# Patient Record
Sex: MALE | Race: OTHER | Employment: FULL TIME | URBAN - METROPOLITAN AREA
[De-identification: names, ages, dates, MRNs, and addresses within clinical notes are randomized per-mention and may not be internally consistent; named-entity substitution may affect disease eponyms.]

---

## 2022-06-27 ENCOUNTER — HOSPITAL ENCOUNTER (EMERGENCY)
Age: 37
Discharge: ANOTHER HOSPITAL | End: 2022-06-27
Attending: EMERGENCY MEDICINE
Payer: OTHER MISCELLANEOUS

## 2022-06-27 ENCOUNTER — APPOINTMENT (OUTPATIENT)
Dept: GENERAL RADIOLOGY | Age: 37
End: 2022-06-27
Attending: STUDENT IN AN ORGANIZED HEALTH CARE EDUCATION/TRAINING PROGRAM
Payer: OTHER MISCELLANEOUS

## 2022-06-27 VITALS
DIASTOLIC BLOOD PRESSURE: 78 MMHG | HEIGHT: 67 IN | TEMPERATURE: 98.8 F | BODY MASS INDEX: 26.68 KG/M2 | WEIGHT: 170 LBS | RESPIRATION RATE: 18 BRPM | SYSTOLIC BLOOD PRESSURE: 125 MMHG | OXYGEN SATURATION: 100 % | HEART RATE: 61 BPM

## 2022-06-27 DIAGNOSIS — S93.334A: Primary | ICD-10-CM

## 2022-06-27 DIAGNOSIS — S92.301B OPEN DISPLACED FRACTURE OF METATARSAL BONE OF RIGHT FOOT, UNSPECIFIED METATARSAL, INITIAL ENCOUNTER: ICD-10-CM

## 2022-06-27 LAB
ABO + RH BLD: NORMAL
ALBUMIN SERPL-MCNC: 3.7 G/DL (ref 3.5–5)
ALBUMIN/GLOB SERPL: 1 {RATIO} (ref 1.1–2.2)
ALP SERPL-CCNC: 99 U/L (ref 45–117)
ALT SERPL-CCNC: 83 U/L (ref 12–78)
ANION GAP SERPL CALC-SCNC: 3 MMOL/L (ref 5–15)
AST SERPL W P-5'-P-CCNC: 31 U/L (ref 15–37)
BASOPHILS # BLD: 0.1 K/UL (ref 0–0.1)
BASOPHILS NFR BLD: 1 % (ref 0–1)
BILIRUB SERPL-MCNC: 0.6 MG/DL (ref 0.2–1)
BLOOD GROUP ANTIBODIES SERPL: NEGATIVE
BUN SERPL-MCNC: 13 MG/DL (ref 6–20)
BUN/CREAT SERPL: 15 (ref 12–20)
CA-I BLD-MCNC: 8.7 MG/DL (ref 8.5–10.1)
CHLORIDE SERPL-SCNC: 110 MMOL/L (ref 97–108)
CO2 SERPL-SCNC: 28 MMOL/L (ref 21–32)
CREAT SERPL-MCNC: 0.85 MG/DL (ref 0.7–1.3)
DIFFERENTIAL METHOD BLD: ABNORMAL
EOSINOPHIL # BLD: 0.1 K/UL (ref 0–0.4)
EOSINOPHIL NFR BLD: 1 % (ref 0–7)
ERYTHROCYTE [DISTWIDTH] IN BLOOD BY AUTOMATED COUNT: 12.5 % (ref 11.5–14.5)
GLOBULIN SER CALC-MCNC: 3.7 G/DL (ref 2–4)
GLUCOSE SERPL-MCNC: 111 MG/DL (ref 65–100)
HCT VFR BLD AUTO: 41 % (ref 36.6–50.3)
HGB BLD-MCNC: 13.7 G/DL (ref 12.1–17)
IMM GRANULOCYTES # BLD AUTO: 0 K/UL (ref 0–0.04)
IMM GRANULOCYTES NFR BLD AUTO: 0 % (ref 0–0.5)
LYMPHOCYTES # BLD: 1.3 K/UL (ref 0.8–3.5)
LYMPHOCYTES NFR BLD: 12 % (ref 12–49)
MCH RBC QN AUTO: 30.4 PG (ref 26–34)
MCHC RBC AUTO-ENTMCNC: 33.4 G/DL (ref 30–36.5)
MCV RBC AUTO: 91.1 FL (ref 80–99)
MONOCYTES # BLD: 0.6 K/UL (ref 0–1)
MONOCYTES NFR BLD: 5 % (ref 5–13)
NEUTS SEG # BLD: 8.9 K/UL (ref 1.8–8)
NEUTS SEG NFR BLD: 81 % (ref 32–75)
NRBC # BLD: 0 K/UL (ref 0–0.01)
NRBC BLD-RTO: 0 PER 100 WBC
PLATELET # BLD AUTO: 257 K/UL (ref 150–400)
PMV BLD AUTO: 9.8 FL (ref 8.9–12.9)
POTASSIUM SERPL-SCNC: 4.1 MMOL/L (ref 3.5–5.1)
PROT SERPL-MCNC: 7.4 G/DL (ref 6.4–8.2)
RBC # BLD AUTO: 4.5 M/UL (ref 4.1–5.7)
SODIUM SERPL-SCNC: 141 MMOL/L (ref 136–145)
SPECIMEN EXP DATE BLD: NORMAL
WBC # BLD AUTO: 10.9 K/UL (ref 4.1–11.1)

## 2022-06-27 PROCEDURE — 80053 COMPREHEN METABOLIC PANEL: CPT

## 2022-06-27 PROCEDURE — 74011250636 HC RX REV CODE- 250/636: Performed by: EMERGENCY MEDICINE

## 2022-06-27 PROCEDURE — 96374 THER/PROPH/DIAG INJ IV PUSH: CPT

## 2022-06-27 PROCEDURE — 73590 X-RAY EXAM OF LOWER LEG: CPT

## 2022-06-27 PROCEDURE — 36415 COLL VENOUS BLD VENIPUNCTURE: CPT

## 2022-06-27 PROCEDURE — 90471 IMMUNIZATION ADMIN: CPT

## 2022-06-27 PROCEDURE — 72170 X-RAY EXAM OF PELVIS: CPT

## 2022-06-27 PROCEDURE — 99285 EMERGENCY DEPT VISIT HI MDM: CPT

## 2022-06-27 PROCEDURE — 74011000250 HC RX REV CODE- 250: Performed by: EMERGENCY MEDICINE

## 2022-06-27 PROCEDURE — 72100 X-RAY EXAM L-S SPINE 2/3 VWS: CPT

## 2022-06-27 PROCEDURE — 90715 TDAP VACCINE 7 YRS/> IM: CPT | Performed by: EMERGENCY MEDICINE

## 2022-06-27 PROCEDURE — 85025 COMPLETE CBC W/AUTO DIFF WBC: CPT

## 2022-06-27 PROCEDURE — 86900 BLOOD TYPING SEROLOGIC ABO: CPT

## 2022-06-27 PROCEDURE — 90472 IMMUNIZATION ADMIN EACH ADD: CPT

## 2022-06-27 PROCEDURE — 75810000053 HC SPLINT APPLICATION

## 2022-06-27 PROCEDURE — 73630 X-RAY EXAM OF FOOT: CPT

## 2022-06-27 PROCEDURE — 96375 TX/PRO/DX INJ NEW DRUG ADDON: CPT

## 2022-06-27 RX ORDER — MORPHINE SULFATE 4 MG/ML
4 INJECTION INTRAVENOUS ONCE
Status: COMPLETED | OUTPATIENT
Start: 2022-06-27 | End: 2022-06-27

## 2022-06-27 RX ADMIN — TETANUS TOXOID, REDUCED DIPHTHERIA TOXOID AND ACELLULAR PERTUSSIS VACCINE, ADSORBED 0.5 ML: 5; 2.5; 8; 8; 2.5 SUSPENSION INTRAMUSCULAR at 14:04

## 2022-06-27 RX ADMIN — CEFAZOLIN 1 G: 1 INJECTION, POWDER, FOR SOLUTION INTRAMUSCULAR; INTRAVENOUS at 14:02

## 2022-06-27 RX ADMIN — TETANUS IMMUNE GLOBULIN (HUMAN) 250 UNITS: 250 INJECTION INTRAMUSCULAR at 14:03

## 2022-06-27 RX ADMIN — MORPHINE SULFATE 4 MG: 4 INJECTION INTRAVENOUS at 14:32

## 2022-06-27 NOTE — ED TRIAGE NOTES
Pt arrives to ED from job site. Pt was on 10ft ladder - ladder began to fall and Pt jumped off and landed on his feet. Obviously deformity noted to R foot w/controlled bleeding. EMS gave 100mcg fentanyl.

## 2022-06-27 NOTE — ED PROVIDER NOTES
EMERGENCY DEPARTMENT HISTORY AND PHYSICAL EXAM      Date: 6/27/2022  Patient Name: Dory Lin      History of Presenting Illness     Chief Complaint   Patient presents with    Fall       History Provided By: Patient    HPI: Dory Lin, 39 y.o. male with a past medical history significant No significant past medical history presents to the ED with cc of fall of a roof, about 10 feet. Landed on his feet, now with significant right foot pain. Did not hit his head. No thinners. No LOC. There are no other complaints, changes, or physical findings at this time. PCP: None        Past History     Past Medical History:  No past medical history on file. Past Surgical History:  No past surgical history on file. Family History:  No family history on file. Social History:  Social History     Tobacco Use    Smoking status: Not on file    Smokeless tobacco: Not on file   Substance Use Topics    Alcohol use: Not on file    Drug use: Not on file       Allergies:  No Known Allergies      Review of Systems     Review of Systems   Unable to perform ROS: Acuity of condition       Physical Exam     Physical Exam  Vitals and nursing note reviewed. Constitutional:       General: He is not in acute distress. Appearance: Normal appearance. HENT:      Head: Normocephalic and atraumatic. Nose: Nose normal.   Eyes:      Conjunctiva/sclera: Conjunctivae normal.      Pupils: Pupils are equal, round, and reactive to light. Cardiovascular:      Rate and Rhythm: Normal rate and regular rhythm. Pulses: Normal pulses. Heart sounds: Normal heart sounds. Pulmonary:      Effort: Pulmonary effort is normal.      Breath sounds: Normal breath sounds. Abdominal:      Palpations: Abdomen is soft. Tenderness: There is no abdominal tenderness. There is no guarding or rebound. Musculoskeletal:      Comments: Swelling and TTP of the right foot with small laceration, obvious deformity. Neurovascularly intact   Skin:     Coloration: Skin is not pale. Findings: No rash. Neurological:      General: No focal deficit present. Mental Status: He is alert. Psychiatric:         Mood and Affect: Mood normal.         Behavior: Behavior normal.         Lab and Diagnostic Study Results     Labs -     Recent Results (from the past 12 hour(s))   CBC WITH AUTOMATED DIFF    Collection Time: 06/27/22  1:18 PM   Result Value Ref Range    WBC 10.9 4.1 - 11.1 K/uL    RBC 4.50 4. 10 - 5.70 M/uL    HGB 13.7 12.1 - 17.0 g/dL    HCT 41.0 36.6 - 50.3 %    MCV 91.1 80.0 - 99.0 FL    MCH 30.4 26.0 - 34.0 PG    MCHC 33.4 30.0 - 36.5 g/dL    RDW 12.5 11.5 - 14.5 %    PLATELET 839 788 - 937 K/uL    MPV 9.8 8.9 - 12.9 FL    NRBC 0.0 0.0  WBC    ABSOLUTE NRBC 0.00 0.00 - 0.01 K/uL    NEUTROPHILS 81 (H) 32 - 75 %    LYMPHOCYTES 12 12 - 49 %    MONOCYTES 5 5 - 13 %    EOSINOPHILS 1 0 - 7 %    BASOPHILS 1 0 - 1 %    IMMATURE GRANULOCYTES 0 0 - 0.5 %    ABS. NEUTROPHILS 8.9 (H) 1.8 - 8.0 K/UL    ABS. LYMPHOCYTES 1.3 0.8 - 3.5 K/UL    ABS. MONOCYTES 0.6 0.0 - 1.0 K/UL    ABS. EOSINOPHILS 0.1 0.0 - 0.4 K/UL    ABS. BASOPHILS 0.1 0.0 - 0.1 K/UL    ABS. IMM. GRANS. 0.0 0.00 - 0.04 K/UL    DF AUTOMATED     METABOLIC PANEL, COMPREHENSIVE    Collection Time: 06/27/22  1:18 PM   Result Value Ref Range    Sodium 141 136 - 145 mmol/L    Potassium 4.1 3.5 - 5.1 mmol/L    Chloride 110 (H) 97 - 108 mmol/L    CO2 28 21 - 32 mmol/L    Anion gap 3 (L) 5 - 15 mmol/L    Glucose 111 (H) 65 - 100 mg/dL    BUN 13 6 - 20 mg/dL    Creatinine 0.85 0.70 - 1.30 mg/dL    BUN/Creatinine ratio 15 12 - 20      GFR est AA >60 >60 ml/min/1.73m2    GFR est non-AA >60 >60 ml/min/1.73m2    Calcium 8.7 8.5 - 10.1 mg/dL    Bilirubin, total 0.6 0.2 - 1.0 mg/dL    AST (SGOT) 31 15 - 37 U/L    ALT (SGPT) 83 (H) 12 - 78 U/L    Alk.  phosphatase 99 45 - 117 U/L    Protein, total 7.4 6.4 - 8.2 g/dL    Albumin 3.7 3.5 - 5.0 g/dL    Globulin 3.7 2.0 - 4.0 g/dL    A-G Ratio 1.0 (L) 1.1 - 2.2     TYPE & SCREEN    Collection Time: 06/27/22  1:19 PM   Result Value Ref Range    Crossmatch Expiration 06/30/2022,4592     ABO/Rh(D) O Positive     Antibody screen Negative        Radiologic Studies -   [unfilled]  CT Results  (Last 48 hours)    None        CXR Results  (Last 48 hours)    None          Medical Decision Making and ED Course   - I am the first and primary provider for this patient AND AM THE PRIMARY PROVIDER OF RECORD. - I reviewed the vital signs, available nursing notes, past medical history, past surgical history, family history and social history. - Initial assessment performed. The patients presenting problems have been discussed, and the staff are in agreement with the care plan formulated and outlined with them. I have encouraged them to ask questions as they arise throughout their visit. Vital Signs-Reviewed the patient's vital signs. Patient Vitals for the past 12 hrs:   Temp Pulse Resp BP SpO2   06/27/22 1545 98.8 °F (37.1 °C) 61 -- 125/78 100 %   06/27/22 1535 -- (!) 58 -- 120/67 100 %   06/27/22 1433 -- 60 -- 126/62 99 %   06/27/22 1313 -- 64 -- 124/66 100 %   06/27/22 1243 -- 70 -- 120/75 100 %   06/27/22 1155 98 °F (36.7 °C) -- -- -- --   06/27/22 1153 -- 66 18 125/77 100 %     Records Reviewed: Nursing Notes      ED Course:       ED Course as of 06/27/22 1620   Mon Jun 27, 2022   151 70-year-old male presents for evaluation of fall from the roof, about 10 feet. Landed on his feet. Main complaint is in his right foot. Significant swelling with a small laceration. Patient has never received a tetanus shot in his life. No back pain. No pain anywhere else. Differential diagnosis includes sprain versus open fracture versus soft tissue injury. Getting labs including a CBC, CMP and plain films of the L-spine, foot, pelvis and tib-fib. Tetanus shot as well as immunoglobulin ordered as well as a gram of ceftriaxone.   Patient declines pain medication at this time. [LW]   56 Paged Dr. Ericka Rahman [LW]   3692 [facility], ortho PA to see. [LW]   1885 Ortho recommends transfer to [facility]. Transfer center contacted. [LW]   5927 Accepted to [facility] by Dr. Cresencio Dougherty.  [LW]      ED Course User Index  [LW] Gita Pennington MD       Consultations:       Consultations: - Orthopedic Surgery Consultant: Dr. Ericka Rahman: We have asked for emergent assistance with regard to this patient. We have discussed the patients HPI, ROS, PE and labd and imaging results this far. They will come and evaluate the patient for their acute orthopedic surgical needs and further treatment with possible admission. Disposition     Disposition: Transferred to [facility] patient verbally agreed to transfer and understand the risks involved as outlined in the EMTALA form. Transferred to Another Facility      Diagnosis     Clinical Impression:   1. Dislocation of metatarsal joint of right foot    2. Open displaced fracture of metatarsal bone of right foot, unspecified metatarsal, initial encounter        Attestations:    Janifer Fabry, MD    Please note that this dictation was completed with [employer], the computer voice recognition software. Quite often unanticipated grammatical, syntax, homophones, and other interpretive errors are inadvertently transcribed by the computer software. Please disregard these errors. Please excuse any errors that have escaped final proofreading. Thank you.

## 2022-06-27 NOTE — CONSULTS
ORTHOPEDIC CONSULT    Patient: Supa Connors MRN: [id number]  SSN: [id number]    YOB: 1985  Age: 39 y.o. Sex: male      Subjective:      Dory Johnson is a 39 y.o. male who is being seen in orthopedic consultation in the emergency room for a fracture of his right foot. The patient states he was working on a roof when he fell off the ladder approximately 10 feet causing injury to his right foot. He denies any injuries to his head or cervical spine from his fall. He denies any back pain. He denies any hip pain. He is not complaining of mild to moderate pain of his right foot. Not in acute distress. He denies any numbness or tingling of his right lower extremity. He denies any other musculoskeletal complaints at this time. No past medical history on file. No past surgical history on file. No family history on file. Social History     Tobacco Use    Smoking status: Not on file    Smokeless tobacco: Not on file   Substance Use Topics    Alcohol use: Not on file      Prior to Admission medications    Not on File       No Known Allergies    Review of Systems:  Review of Systems   Constitutional: Negative. HENT: Negative. Eyes: Negative. Respiratory: Negative. Cardiovascular: Negative. Gastrointestinal: Negative. Genitourinary: Negative. Musculoskeletal: Positive for joint pain. Right foot   Skin: Negative. Neurological: Negative. Endo/Heme/Allergies: Negative. Psychiatric/Behavioral: Negative. Objective:     No current facility-administered medications for this encounter. No current outpatient medications on file.       Vitals:    06/27/22 1313 06/27/22 1433 06/27/22 1535 06/27/22 1545   BP: 124/66 126/62 120/67 125/78   Pulse: 64 60 (!) 58 61   Resp:       Temp:    98.8 °F (37.1 °C)   SpO2: 100% 99% 100% 100%   Weight:       Height:            Alert and oriented x3, No apparent distress    Physical Exam:  Foot/lower extremity: There is mild swelling seen was right foot. There was a small punctate wound seen over the dorsal aspect over the first metatarsal.  Mild bloody drainage seen from the site. DP/PT pulses are palpable. Cap refill is 2 seconds. There is limited range of motion of the toes of the right foot secondary to pain. There was tenderness to palpation throughout his midfoot. No tenderness palpation throughout the calcaneus. Achilles tendon felt intact. Heredia's test within normal limits. No calf pain to palpation. Full range of motion of his right knee and hip without tenderness. No tenderness palpation throughout his right femur or tibia. He is able to straight leg raise without discomfort. Secondary exam: Tenderness palpation throughout his pelvis. No tenderness palpation of his lumbar spine and sacral region. Full range of motion of his left lower extremity without tenderness. No tenderness palpation throughout his left lower extremity. Full range of motion of the cervical spine without tenderness. No tenderness palpation of vertebral bodies or paravertebral muscles of the cervical spine. Labs:  CBC:  Recent Labs     06/27/22  1318   WBC 10.9   RBC 4.50   HGB 13.7   HCT 41.0   MCV 91.1   RDW 12.5        CHEMISTRIES:  Recent Labs     06/27/22  1318      K 4.1   *   CO2 28   BUN 13   CREA 0.85   CA 8.7   PT/INR:No results for input(s): INR, INREXT in the last 72 hours. No lab exists for component: PROTIME  APTT:No results for input(s): APTT in the last 72 hours. LIVER PROFILE:  Recent Labs     06/27/22  1318   AST 31   ALT 83*       IMAGING:  X-rays taken of his right foot show comminuted displaced fractures of the second third and fourth metatarsals. There is dislocation of the third and fourth tarsometatarsal joints. Is a small amount of subcuticular air present. X-rays of his pelvis show no acute fractures. No widening of pubic symphysis.     X-rays taken of his lumbar spine show no acute fractures. There is a grade 1 anterolisthesis of L4 and L5. X-rays taken of his right tibia showed no acute fractures. Assessment/Plan:     Fracture second third fourth metatarsal right foot with dislocation of the third and fourth metatarsal joints. I have discussed this patient's clinical, physical findings as well as imaging studies with Dr. Taqueria Childers. Recommending patient be transferred to [facility] for high-level care as this patient will require possible multiple surgical interventions as well as appropriate follow-up. Syed with ER attending. Recommend thorough irrigation of wound and application of a posterior splint of his left lower extremity prior to transfer. Patient was examined direct consult with Dr. Taqueria Childers. Thank you for the courtesy of this consult.     Signed By: Fransisca Antoine PA-C     June 27, 2022

## 2022-06-27 NOTE — PROGRESS NOTES
Spiritual Care Assessment/Progress Note  [facility]      NAME: Belkis Rizvi      MRN: [id number]  AGE: 39 y.o. SEX: male  Zoroastrianism Affiliation: Shinto   Language: English     6/27/2022     Total Time (in minutes): 12     Spiritual Assessment begun in [address] DEPT through conversation with:         [x]Patient        [] Family    [] Friend(s)        Reason for Consult: Crisis     Spiritual beliefs: (Please include comment if needed)     [] Identifies with a lillie tradition:         [] Supported by a lillie community:            [] Claims no spiritual orientation:           [] Seeking spiritual identity:                [] Adheres to an individual form of spirituality:           [x] Not able to assess:                           Identified resources for coping:      [] Prayer                               [] Music                  [] Guided Imagery     [] Family/friends                 [] Pet visits     [] Devotional reading                         [x] Unknown     [] Other:                                              Interventions offered during this visit: (See comments for more details)    Patient Interventions: Crisis,Initial visit,Other (comment) (Silent support and prayer)           Plan of Care:     [] Support spiritual and/or cultural needs    [] Support AMD and/or advance care planning process      [] Support grieving process   [] Coordinate Rites and/or Rituals    [] Coordination with community clergy   [] No spiritual needs identified at this time   [] Detailed Plan of Care below (See Comments)  [] Make referral to Music Therapy  [] Make referral to Pet Therapy     [] Make referral to Addiction services  [] Make referral to [place]  [] Make referral to Spiritual Care Partner  [] No future visits requested        [x] Contact Spiritual Care for further referrals     Comments:  Visited patient in ED for Code trauma bravo.   Medical staff were providing care for patient during the visit. No visitors were present. Provided silent support and prayer. Contact chaplains for further referrals. Chaplain Linus Goldmann, M.Div.    can be reached by calling the  at [facility]  [phone]